# Patient Record
Sex: FEMALE | Race: BLACK OR AFRICAN AMERICAN | NOT HISPANIC OR LATINO | URBAN - METROPOLITAN AREA
[De-identification: names, ages, dates, MRNs, and addresses within clinical notes are randomized per-mention and may not be internally consistent; named-entity substitution may affect disease eponyms.]

---

## 2017-02-24 ENCOUNTER — EMERGENCY (EMERGENCY)
Facility: HOSPITAL | Age: 27
LOS: 0 days | Discharge: HOME | End: 2017-02-24
Attending: EMERGENCY MEDICINE | Admitting: EMERGENCY MEDICINE
Payer: MEDICAID

## 2017-02-24 VITALS
OXYGEN SATURATION: 99 % | TEMPERATURE: 98 F | RESPIRATION RATE: 19 BRPM | SYSTOLIC BLOOD PRESSURE: 122 MMHG | HEART RATE: 70 BPM | DIASTOLIC BLOOD PRESSURE: 77 MMHG

## 2017-02-24 VITALS — HEIGHT: 61 IN | WEIGHT: 214.95 LBS

## 2017-02-24 DIAGNOSIS — R53.1 WEAKNESS: ICD-10-CM

## 2017-02-24 DIAGNOSIS — G51.0 BELL'S PALSY: ICD-10-CM

## 2017-02-24 DIAGNOSIS — R20.0 ANESTHESIA OF SKIN: ICD-10-CM

## 2017-02-24 PROCEDURE — 99283 EMERGENCY DEPT VISIT LOW MDM: CPT

## 2017-02-24 RX ORDER — VALACYCLOVIR 500 MG/1
1000 TABLET, FILM COATED ORAL ONCE
Qty: 0 | Refills: 0 | Status: COMPLETED | OUTPATIENT
Start: 2017-02-24 | End: 2017-02-24

## 2017-02-24 RX ORDER — VALACYCLOVIR 500 MG/1
1 TABLET, FILM COATED ORAL
Qty: 30 | Refills: 0
Start: 2017-02-24 | End: 2017-03-06

## 2017-02-24 RX ADMIN — Medication 60 MILLIGRAM(S): at 20:54

## 2017-02-24 RX ADMIN — VALACYCLOVIR 1000 MILLIGRAM(S): 500 TABLET, FILM COATED ORAL at 20:54

## 2017-02-24 RX ADMIN — Medication 1 DROP(S): at 20:54

## 2017-02-24 NOTE — ED STATDOCS - NS ED MD SCRIBE ATTENDING SCRIBE SECTIONS
PHYSICAL EXAM/PAST MEDICAL/SURGICAL/SOCIAL HISTORY/DISPOSITION/HISTORY OF PRESENT ILLNESS/REVIEW OF SYSTEMS

## 2017-02-24 NOTE — ED STATDOCS - ENMT NEGATIVE STATEMENT, MLM
Ears: no ear pain and no hearing problems.Nose: no nasal congestion and no nasal drainage.Mouth/Throat: no dysphagia, no hoarseness and no throat pain.Neck: no lumps, no pain, no stiffness and no swollen glands. +left sided facial numbness/tingling/pain

## 2017-02-24 NOTE — ED STATDOCS - OBJECTIVE STATEMENT
25 y/o F denies PMHx of presents to ED for left side facial numbness and weakness x2 days. Pt states she feels pain/numbness/tingling on left jaw, left ear, left tongue and then noticed gradual weakness of left side of face with inability to move left nostril, close left eye, purse her lips. No other complaints. No trouble swallowing, arm/leg weakness, trauma, SOB, abd pain, N/V/D.

## 2017-02-24 NOTE — ED STATDOCS - ENMT, MLM
Nasal mucosa clear.  Mouth with normal mucosa  Throat has no vesicles, no oropharyngeal exudates and uvula is midline. Difficulty closing left eyelid, slight difficulty raising left eyebrow, left cheek paralysis. No facial droop.

## 2017-02-24 NOTE — ED STATDOCS - DETAILS:
I, Nanci Lewis, performed the initial face to face bedside interview with this patient regarding history of present illness, review of symptoms and relevant past medical, social and family history.  I completed an independent physical examination.  I was the initial provider who evaluated this patient.   The history, relevant review of systems, past medical and surgical history, medical decision making, and physical examination was documented by the scribe in my presence and I attest to the accuracy of the documentation.

## 2017-02-24 NOTE — ED STATDOCS - CHPI ED SYMPTOM NEG
no abdominal distention/no dysuria/no burning urination/no palpitations/no blood in stool/no diarrhea/no vomiting/no fever/no hematuria/no nausea

## 2017-11-10 PROBLEM — Z00.00 ENCOUNTER FOR PREVENTIVE HEALTH EXAMINATION: Status: ACTIVE | Noted: 2017-11-10

## 2022-08-31 ENCOUNTER — ASOB RESULT (OUTPATIENT)
Age: 32
End: 2022-08-31

## 2022-08-31 ENCOUNTER — APPOINTMENT (OUTPATIENT)
Dept: ANTEPARTUM | Facility: CLINIC | Age: 32
End: 2022-08-31

## 2022-08-31 PROCEDURE — 76811 OB US DETAILED SNGL FETUS: CPT

## 2022-08-31 PROCEDURE — 76817 TRANSVAGINAL US OBSTETRIC: CPT

## 2022-09-14 ENCOUNTER — ASOB RESULT (OUTPATIENT)
Age: 32
End: 2022-09-14

## 2022-09-14 ENCOUNTER — EMERGENCY (EMERGENCY)
Facility: HOSPITAL | Age: 32
LOS: 0 days | Discharge: HOME | End: 2022-09-14
Attending: EMERGENCY MEDICINE
Payer: MEDICAID

## 2022-09-14 ENCOUNTER — APPOINTMENT (OUTPATIENT)
Dept: ANTEPARTUM | Facility: CLINIC | Age: 32
End: 2022-09-14

## 2022-09-14 VITALS
DIASTOLIC BLOOD PRESSURE: 84 MMHG | SYSTOLIC BLOOD PRESSURE: 127 MMHG | RESPIRATION RATE: 18 BRPM | TEMPERATURE: 98 F | HEART RATE: 93 BPM | OXYGEN SATURATION: 98 %

## 2022-09-14 VITALS — WEIGHT: 190.04 LBS | HEIGHT: 61 IN

## 2022-09-14 DIAGNOSIS — M54.2 CERVICALGIA: ICD-10-CM

## 2022-09-14 DIAGNOSIS — O99.352 DISEASES OF THE NERVOUS SYSTEM COMPLICATING PREGNANCY, SECOND TRIMESTER: ICD-10-CM

## 2022-09-14 DIAGNOSIS — G51.0 BELL'S PALSY: ICD-10-CM

## 2022-09-14 DIAGNOSIS — R29.810 FACIAL WEAKNESS: ICD-10-CM

## 2022-09-14 DIAGNOSIS — Z3A.25 25 WEEKS GESTATION OF PREGNANCY: ICD-10-CM

## 2022-09-14 PROCEDURE — 59025 FETAL NON-STRESS TEST: CPT | Mod: 26

## 2022-09-14 PROCEDURE — 99284 EMERGENCY DEPT VISIT MOD MDM: CPT

## 2022-09-14 PROCEDURE — 99282 EMERGENCY DEPT VISIT SF MDM: CPT

## 2022-09-14 PROCEDURE — 99213 OFFICE O/P EST LOW 20 MIN: CPT | Mod: 25

## 2022-09-14 PROCEDURE — 76816 OB US FOLLOW-UP PER FETUS: CPT

## 2022-09-14 NOTE — ED STATDOCS - OBJECTIVE STATEMENT
30 y/o female 25 weeks pregnant with a PMHx of [place] Palsy presents to the ED c/o right sided neck pain and right facial droop since yesterday. Denies vaginal bleeding/discharge. Pt took Tylenol at home, last dose 9pm yesterday. No other complaints at this time. OBGYN: Dr. Auguste.

## 2022-09-14 NOTE — ED STATDOCS - PROGRESS NOTE DETAILS
called and d/w OB resident and attending, ok to give prednisone 60mg qd x7 days for bell's palsy treatment, will send rx and d/c upstairs to OB for NST, pt agreeable to d/c and plan of care, return precautions dolly Cornell PA-C

## 2022-09-14 NOTE — ED ADULT TRIAGE NOTE - CHIEF COMPLAINT QUOTE
PT presents to er with complaints of right sided neck and facial pain with right sided facial numbness and asymmetry new onset of symptoms yesterday at 7am, denies blood thinner use, states she feels her taste is gone and tongue feels strange, pt states she has a history of [place] Palsy from 2016, sent in for evaluation by OBGYN , pt is 25 weeks gestation with first child. Pt denies chest pain, sob, change in vision.

## 2022-09-14 NOTE — ED STATDOCS - NS ED ATTENDING STATEMENT MOD
This was a shared visit with the CASTRO. I reviewed and verified the documentation and independently performed the documented:

## 2022-09-14 NOTE — ED ADULT NURSE NOTE - CHIEF COMPLAINT QUOTE
PT presents to er with complaints of right sided neck and facial pain with right sided facial numbness and asymmetry new onset of symptoms yesterday at 7am, denies blood thinner use, states she feels her taste is gone and tongue feels strange, pt states she has a history of [place] Palsy from 2016, sent in for evaluation by OBGYN , pt is 25 weeks gestation with first child. Pt denies chest pain, sob, change in vision.

## 2022-09-14 NOTE — ED STATDOCS - CLINICAL SUMMARY MEDICAL DECISION MAKING FREE TEXT BOX
Clinical exam consistent with [place]. Palsy. Pt also with tightness of right SCM. Will give steroids. Clinical exam consistent with [place]. Palsy. Pt also with tightness of right SCM.  Not suspicious for vascular etiology. Will give steroids for bell's.

## 2022-09-14 NOTE — PROGRESS NOTE ADULT - ATTENDING COMMENTS
NST reviewed, reasurring, appropiate for GA. Patient managed for Bell's Palsy with prednisone by ED.

## 2022-09-14 NOTE — ED ADULT NURSE NOTE - ASSOCIATED SYMPTOMS
Called patient and informed of messages below. Patient wants to know if Dr. Shah will be prescribing any antiinflammatory medication? If so she would like a written script instead of e-scribing the medication over to [facility]. Advised patient will give call back regarding her question.    hx Bell's Palsy with right facial droop

## 2022-09-14 NOTE — ED STATDOCS - PHYSICAL EXAMINATION
Constitutional: NAD, well appearing  HEENT: no rhinorrhea, PERRL, no oropharyngeal erythema or exudates, midline uvula.  TMs clear.  CVS:  RRR, no m/r/g  Resp:  CTAB  GI: soft, ntnd  MSK:  no restriction to rom, full ROM to all extremities  Neuro:  A&Ox3, 5/5 strength to all extremities,  SILT to all extremities. Facial droop to right eyebrow and right nasolabial fold.   Skin: no rash  psych: clear thought content  Heme/lymph:  No LAD

## 2022-09-14 NOTE — PROGRESS NOTE ADULT - ASSESSMENT
32 yo F  at 25 weeks 1 day with PMH Bell's Palsy in 2016 who presents with Bell's Palsy.     - NST done at bedside - baseline 120 bpm, accelerations, no decelerations  - Pt to be sent home on Prednisone 60 mg QD for Bell's Palsy   - Stable for discharge with follow up with OBGYN Dr. Auguste/ Dr. Dias  30 yo F  at 25 weeks 1 day with PMH Bell's Palsy in 2016 who presents with Bell's Palsy.     - NST done at bedside - baseline 120 bpm, accelerations, no decelerations appropiate for gestational age  - Pt to be sent home on Prednisone 60 mg QD for Bell's Palsy   - Stable for discharge with follow up with [facility] Auguste/ Dr. Dias

## 2022-09-14 NOTE — ED STATDOCS - NS ED ROS FT
Constitutional: nad, well appearing  HEENT:  no nasal congestion, eye drainage or ear pain.    CVS:  no cp  Resp:  No sob, no cough  GI:  no abdominal pain, no nausea or vomiting  :  no dysuria  MSK: no limited ROM. +right sided neck pain   Skin: no rash  Neuro: no change in mental status or level of consciousness. +right facial droop   Heme/lymph: no bleeding

## 2022-09-14 NOTE — ED ADULT NURSE NOTE - OBJECTIVE STATEMENT
Hx of Bell's Palsy a right facial droop noted.  Pain behind her right ear and neck area.  Intermittent.

## 2022-09-14 NOTE — ED STATDOCS - PATIENT PORTAL LINK FT
You can access the FollowMyHealth Patient Portal offered by [facility] by registering at the following website: [email or web address]. By joining [facility]’s FollowMyHealth portal, you will also be able to view your health information using other applications (apps) compatible with our system.

## 2022-09-14 NOTE — PROGRESS NOTE ADULT - SUBJECTIVE AND OBJECTIVE BOX
Patient is a 31y old Female  at 25 weeks 1 day with PMH Bell's Palsy in 2016 who presents with a chief complaint of Bell's Palsy.     INTERVAL HPI:  Pt was seen and examined. Pt states that she feels fine, reports that she had Bell's Palsy in 2016 and was on a course of steroids at that time.   Denies contractions, reports fetal movement, denies leakage of fluids, denies vaginal bleeding.   Pt has been following up with Dr. Auguste and Dr. Dias for prenatal care.  Prenatal course otherwise uncomplicated.   No steroids were used during pregnancy.   Denies fever, chills, headache, blurry vision, dizziness, CP, SOB, palpitations, abdominal pain, n/v.  No recent travels, no recent sick contacts.   No other complaints at this time.       OBHx: 2010, /C/S, FT, boy, 7 lbs 14 oz, no complications   GYNHx: Regular menses LMP: 3/22/22, admits to history of gonorrhea many years ago, no recent STDs, abnormal pap smears many years ago, no history of fibroids or ovarian cysts   PMH: Bell's palsy in 2016  PSH: Appendectomy  Meds: PNV, iron supplements  Allergies: NKDA  FHx: No significant FH   Social: Denies ETOH, admits to tobacco use - smokes 1-3 cigg some days         MEDICATIONS  (STANDING):    MEDICATIONS  (PRN):      Allergies    No Known Allergies    Intolerances        REVIEW OF SYSTEMS:  CONSTITUTIONAL: No fever or chills  HEENT:  No headache, no sore throat  RESPIRATORY: No cough, wheezing, or shortness of breath  CARDIOVASCULAR: No chest pain, palpitations  GASTROINTESTINAL: No abd pain, nausea, vomiting, or diarrhea  GENITOURINARY: No dysuria, frequency, or hematuria  NEUROLOGICAL: admits facial weakness and facial droop 2/2 bell's palsy   MUSCULOSKELETAL: no myalgias     Vital Signs Last 24 Hrs  T(C): 36.8 (14 Sep 2022 12:04), Max: 36.8 (14 Sep 2022 12:04)  T(F): 98.3 (14 Sep 2022 12:04), Max: 98.3 (14 Sep 2022 12:04)  HR: 93 (14 Sep 2022 12:04) (93 - 93)  BP: 127/84 (14 Sep 2022 12:04) (127/84 - 127/84)  BP(mean): 97 (14 Sep 2022 12:04) (97 - 97)  RR: 18 (14 Sep 2022 12:04) (18 - 18)  SpO2: 98% (14 Sep 2022 12:04) (98% - 98%)    Parameters below as of 14 Sep 2022 12:04  Patient On (Oxygen Delivery Method): room air        PHYSICAL EXAM:  GENERAL: NAD  HEENT:  anicteric, moist mucous membranes  CHEST/LUNG:  CTA b/l, no rales, wheezes, or rhonchi  HEART:  RRR, S1, S2  ABDOMEN:  BS+, soft, nontender, gravid  EXTREMITIES: no edema, cyanosis, or calf tenderness  NERVOUS SYSTEM: AAOx3, right facial droop, strength 5/5 BL upper and lower extremities, sensation intact      LABS:  None       RADIOLOGY & ADDITIONAL TESTS:    Personally reviewed.  Patient is a 31y old Female  at 25 weeks 1 day with PMH Bell's Palsy in 2016 who presents with a chief complaint of Bell's Palsy.     INTERVAL HPI:  Pt was seen and examined. Pt states that she feels fine, reports that she had Bell's Palsy in 2016 and was on a course of steroids at that time. Reports right facial droop since yesterday.  Denies contractions, reports fetal movement, denies leakage of fluids, denies vaginal bleeding.   Pt has been following up with Dr. Auguste and Dr. Dias for prenatal care.  Prenatal course otherwise uncomplicated.   No steroids were used during pregnancy.   Denies fever, chills, headache, blurry vision, dizziness, CP, SOB, palpitations, abdominal pain, n/v.  No recent travels, no recent sick contacts.   No other complaints at this time.       OBHx: 2010, /C/S, FT, boy, 7 lbs 14 oz, no complications   GYNHx: Regular menses LMP: 3/22/22, admits to history of gonorrhea many years ago, no recent STDs, abnormal pap smears many years ago, no history of fibroids or ovarian cysts   PMH: Bell's palsy in 2016  PSH: Appendectomy  Meds: PNV, iron supplements  Allergies: NKDA  FHx: No significant FH   Social: Denies ETOH, admits to tobacco use - smokes 1-3 cigg some days         MEDICATIONS  (STANDING):    MEDICATIONS  (PRN):      Allergies    No Known Allergies    Intolerances        REVIEW OF SYSTEMS:  CONSTITUTIONAL: No fever or chills  HEENT:  No headache, no sore throat  RESPIRATORY: No cough, wheezing, or shortness of breath  CARDIOVASCULAR: No chest pain, palpitations  GASTROINTESTINAL: No abd pain, nausea, vomiting, or diarrhea  GENITOURINARY: No dysuria, frequency, or hematuria  NEUROLOGICAL: admits facial weakness and facial droop 2/2 bell's palsy   MUSCULOSKELETAL: no myalgias     Vital Signs Last 24 Hrs  T(C): 36.8 (14 Sep 2022 12:04), Max: 36.8 (14 Sep 2022 12:04)  T(F): 98.3 (14 Sep 2022 12:04), Max: 98.3 (14 Sep 2022 12:04)  HR: 93 (14 Sep 2022 12:04) (93 - 93)  BP: 127/84 (14 Sep 2022 12:04) (127/84 - 127/84)  BP(mean): 97 (14 Sep 2022 12:04) (97 - 97)  RR: 18 (14 Sep 2022 12:04) (18 - 18)  SpO2: 98% (14 Sep 2022 12:04) (98% - 98%)    Parameters below as of 14 Sep 2022 12:04  Patient On (Oxygen Delivery Method): room air        PHYSICAL EXAM:  GENERAL: NAD  HEENT:  anicteric, moist mucous membranes  CHEST/LUNG:  CTA b/l, no rales, wheezes, or rhonchi  HEART:  RRR, S1, S2  ABDOMEN:  BS+, soft, nontender, gravid  EXTREMITIES: no edema, cyanosis, or calf tenderness  NERVOUS SYSTEM: AAOx3, right facial droop, strength 5/5 BL upper and lower extremities, sensation intact      LABS:  None       RADIOLOGY & ADDITIONAL TESTS:    Personally reviewed.

## 2022-09-14 NOTE — ED STATDOCS - NSFOLLOWUPINSTRUCTIONS_ED_ALL_ED_FT
[place] Palsy    WHAT YOU NEED TO KNOW:    Bell palsy is a sudden weakness or paralysis of one side of your face. Bell palsy occurs when the nerve that controls the muscles in your face becomes swollen or irritated.    DISCHARGE INSTRUCTIONS:    Medicines: You may need any of the following:  •Medicine may be given to decrease swelling and irritation of your facial nerve.      •Antiviral medicine may be given if your healthcare provider thinks a virus caused your Bell palsy.      •Acetaminophen decreases pain and fever. It is available without a doctor's order. Ask how much to take and how often to take it. Follow directions. Read the labels of all other medicines you are using to see if they also contain acetaminophen, or ask your doctor or pharmacist. Acetaminophen can cause liver damage if not taken correctly.      •NSAIDs, such as ibuprofen, help decrease swelling, pain, and fever. This medicine is available with or without a doctor's order. NSAIDs can cause stomach bleeding or kidney problems in certain people. If you take blood thinner medicine, always ask your healthcare provider if NSAIDs are safe for you. Always read the medicine label and follow directions.      •Take your medicine as directed. Contact your healthcare provider if you think your medicine is not helping or if you have side effects. Tell your provider if you are allergic to any medicine. Keep a list of the medicines, vitamins, and herbs you take. Include the amounts, and when and why you take them. Bring the list or the pill bottles to follow-up visits. Carry your medicine list with you in case of an emergency.      Follow up with your healthcare provider as directed: Write down your questions so you remember to ask them during your visits.    Eye care: Your healthcare provider may recommend that you use artificial tears during the day to keep your eye moist. You may need to use an eye ointment at night. You may also need to wear a patch over your eye and tape it shut while you sleep. This helps to keep your eye from getting dry and infected. Wear sunglasses to protect your eye from direct sunlight. Stay away from places that have fumes, dust, or other particles in the air that may harm your eye.    Physical therapy: A physical therapist may teach you how to massage your face and exercise the nerves and muscles in your face. This may help you get better sooner and prevent long-term problems. You can exercise on your own when your facial movement begins to return. Open and close your eye, wink, and smile wide. Do the exercises for 15 or 20 minutes several times a day.    Contact your healthcare provider if:   •You have a fever.      •Your eye becomes red, irritated, or painful.      •You have questions or concerns about your condition or care.      Seek care immediately or call 911 if:   •You develop weakness or numbness on one side of your body (other than your face).      •You have double vision, or you lose vision in your eye.      •You have trouble thinking clearly.

## 2022-11-29 PROBLEM — G51.0 BELL'S PALSY: Chronic | Status: ACTIVE | Noted: 2022-09-15

## 2022-11-30 ENCOUNTER — APPOINTMENT (OUTPATIENT)
Dept: ANTEPARTUM | Facility: CLINIC | Age: 32
End: 2022-11-30

## 2022-11-30 ENCOUNTER — ASOB RESULT (OUTPATIENT)
Age: 32
End: 2022-11-30

## 2022-11-30 PROCEDURE — 76821 MIDDLE CEREBRAL ARTERY ECHO: CPT

## 2022-11-30 PROCEDURE — 76816 OB US FOLLOW-UP PER FETUS: CPT

## 2024-08-28 ENCOUNTER — APPOINTMENT (OUTPATIENT)
Dept: ANTEPARTUM | Facility: CLINIC | Age: 34
End: 2024-08-28

## 2024-09-04 ENCOUNTER — APPOINTMENT (OUTPATIENT)
Dept: ANTEPARTUM | Facility: CLINIC | Age: 34
End: 2024-09-04

## 2025-04-03 NOTE — ED STATDOCS - CHIEF COMPLAINT
Detail Level: Zone Continue Regimen: Continue skirizzi The patient is a 26y year old Female complaining of facial numbness. Render In Strict Bullet Format?: No